# Patient Record
Sex: MALE | Race: WHITE | Employment: UNEMPLOYED | URBAN - METROPOLITAN AREA
[De-identification: names, ages, dates, MRNs, and addresses within clinical notes are randomized per-mention and may not be internally consistent; named-entity substitution may affect disease eponyms.]

---

## 2019-10-06 ENCOUNTER — APPOINTMENT (OUTPATIENT)
Dept: GENERAL RADIOLOGY | Age: 44
End: 2019-10-06
Attending: EMERGENCY MEDICINE
Payer: SELF-PAY

## 2019-10-06 ENCOUNTER — HOSPITAL ENCOUNTER (EMERGENCY)
Age: 44
Discharge: HOME | End: 2019-10-06
Attending: EMERGENCY MEDICINE
Payer: SELF-PAY

## 2019-10-06 ENCOUNTER — APPOINTMENT (OUTPATIENT)
Dept: ULTRASOUND IMAGING | Age: 44
End: 2019-10-06
Attending: EMERGENCY MEDICINE
Payer: SELF-PAY

## 2019-10-06 VITALS
RESPIRATION RATE: 16 BRPM | BODY MASS INDEX: 28.2 KG/M2 | HEART RATE: 99 BPM | SYSTOLIC BLOOD PRESSURE: 127 MMHG | HEIGHT: 70 IN | OXYGEN SATURATION: 97 % | WEIGHT: 197 LBS | TEMPERATURE: 98.3 F | DIASTOLIC BLOOD PRESSURE: 79 MMHG

## 2019-10-06 DIAGNOSIS — S89.91XA INJURY OF RIGHT KNEE, INITIAL ENCOUNTER: Primary | ICD-10-CM

## 2019-10-06 DIAGNOSIS — R60.9 DEPENDENT EDEMA: ICD-10-CM

## 2019-10-06 LAB
ALBUMIN SERPL-MCNC: 3.4 G/DL (ref 3.5–5)
ALBUMIN/GLOB SERPL: 1.2 {RATIO} (ref 1.2–3.5)
ALP SERPL-CCNC: 50 U/L (ref 50–136)
ALT SERPL-CCNC: 105 U/L (ref 12–65)
ANION GAP SERPL CALC-SCNC: 7 MMOL/L (ref 7–16)
AST SERPL-CCNC: 267 U/L (ref 15–37)
BASOPHILS # BLD: 0.1 K/UL (ref 0–0.2)
BASOPHILS NFR BLD: 1 % (ref 0–2)
BILIRUB SERPL-MCNC: 0.2 MG/DL (ref 0.2–1.1)
BUN SERPL-MCNC: 12 MG/DL (ref 6–23)
CALCIUM SERPL-MCNC: 8.7 MG/DL (ref 8.3–10.4)
CHLORIDE SERPL-SCNC: 111 MMOL/L (ref 98–107)
CO2 SERPL-SCNC: 28 MMOL/L (ref 21–32)
CREAT SERPL-MCNC: 1.03 MG/DL (ref 0.8–1.5)
DIFFERENTIAL METHOD BLD: NORMAL
EOSINOPHIL # BLD: 0.2 K/UL (ref 0–0.8)
EOSINOPHIL NFR BLD: 2 % (ref 0.5–7.8)
ERYTHROCYTE [DISTWIDTH] IN BLOOD BY AUTOMATED COUNT: 13.8 % (ref 11.9–14.6)
GLOBULIN SER CALC-MCNC: 2.9 G/DL (ref 2.3–3.5)
GLUCOSE SERPL-MCNC: 99 MG/DL (ref 65–100)
HCT VFR BLD AUTO: 43 % (ref 41.1–50.3)
HGB BLD-MCNC: 14.2 G/DL (ref 13.6–17.2)
IMM GRANULOCYTES # BLD AUTO: 0 K/UL (ref 0–0.5)
IMM GRANULOCYTES NFR BLD AUTO: 0 % (ref 0–5)
LACTATE BLD-SCNC: 1.97 MMOL/L (ref 0.5–1.9)
LYMPHOCYTES # BLD: 2.4 K/UL (ref 0.5–4.6)
LYMPHOCYTES NFR BLD: 23 % (ref 13–44)
MCH RBC QN AUTO: 31.9 PG (ref 26.1–32.9)
MCHC RBC AUTO-ENTMCNC: 33 G/DL (ref 31.4–35)
MCV RBC AUTO: 96.6 FL (ref 79.6–97.8)
MONOCYTES # BLD: 0.9 K/UL (ref 0.1–1.3)
MONOCYTES NFR BLD: 8 % (ref 4–12)
NEUTS SEG # BLD: 6.8 K/UL (ref 1.7–8.2)
NEUTS SEG NFR BLD: 66 % (ref 43–78)
NRBC # BLD: 0 K/UL (ref 0–0.2)
PLATELET # BLD AUTO: 306 K/UL (ref 150–450)
PMV BLD AUTO: 9.9 FL (ref 9.4–12.3)
POTASSIUM SERPL-SCNC: 3.9 MMOL/L (ref 3.5–5.1)
PROT SERPL-MCNC: 6.3 G/DL (ref 6.3–8.2)
RBC # BLD AUTO: 4.45 M/UL (ref 4.23–5.6)
SODIUM SERPL-SCNC: 146 MMOL/L (ref 136–145)
WBC # BLD AUTO: 10.3 K/UL (ref 4.3–11.1)

## 2019-10-06 PROCEDURE — 71046 X-RAY EXAM CHEST 2 VIEWS: CPT

## 2019-10-06 PROCEDURE — 93970 EXTREMITY STUDY: CPT

## 2019-10-06 PROCEDURE — 83605 ASSAY OF LACTIC ACID: CPT

## 2019-10-06 PROCEDURE — 85025 COMPLETE CBC W/AUTO DIFF WBC: CPT

## 2019-10-06 PROCEDURE — 73562 X-RAY EXAM OF KNEE 3: CPT

## 2019-10-06 PROCEDURE — 99284 EMERGENCY DEPT VISIT MOD MDM: CPT | Performed by: EMERGENCY MEDICINE

## 2019-10-06 PROCEDURE — 80053 COMPREHEN METABOLIC PANEL: CPT

## 2019-10-06 NOTE — ED TRIAGE NOTES
Pt reports bilateral leg swelling in calves for months, reports pain in right calf for over a month as well and right knee pain. Pt denies shob or cp. Ambulatory without difficulty. Bilateral pedal pulses present and palpable in triage.

## 2019-10-06 NOTE — ED PROVIDER NOTES
[facility] Emergency Department  Arrival Date/Time: No admission date for patient encounter. Sebastián Francisco  MRN: [id number]    YOB: 1975   37 y.o. male    [facility] EMERGENCY DEPT Room/bed info not found  Seen on 10/6/2019 @ 1:46 PM      Today's Chief Complaint: No chief complaint on file. TRIAGE Provider NOTE:  Presents with RLE pain mild redness. States both legs swollen for 3 weeks. No cp or shob. States injured knee playing basketball and has gotten worse. Legs do not get much better after rest.  RLE swollen more then Left. No previous. States hx of cellulitis. Pain above medial ankle. No significant redness. Denies alcohol abuse. Robbin Dorsey MD; 10/6/2019 @1:46 PM============================     Sebastián Francisco is a 37 y.o. male seen on 10/6/2019 at 1:46 PM in the [facility] EMERGENCY DEPT   HPI:    Here after playing basketball landing somewhat awkwardly and since that time is had some progressive pain to the right knee right calf and with this some swelling noted to the area from the knee down. A knee brace that is fairly tight to this area. No history of surgery to this. The original injury occurred 4 to 5 days ago. no CP or SOB          Review of Systems: Review of Systems   Constitutional: Negative for chills and fever. Respiratory: Negative. Cardiovascular: Negative. Genitourinary: Negative. Musculoskeletal: Positive for arthralgias. Negative for joint swelling and myalgias. All other systems reviewed and are negative. Past Medical History: Primary Care Doctor: No primary care provider on file. Meds, PMH, PSHx, SocHx at end of this note     Allergies: Allergies not on file      Key Anti-Platelet Anticoagulant Meds     The patient is on no antiplatelet meds or anticoagulants. Physical Exam:  Nursing documentation reviewed. There were no vitals filed for this visit. Vital signs were reviewed.   Physical Exam   Constitutional: He appears well-developed and well-nourished. No distress. HENT:   Head: Atraumatic. Eyes: No scleral icterus. Neck: Neck supple. Cardiovascular: Normal rate. Pulmonary/Chest: Effort normal. No respiratory distress. Musculoskeletal: Normal range of motion. Neurological: He is alert. Skin: Skin is warm and dry. Psychiatric: Thought content normal.   Nursing note and vitals reviewed. MEDICAL DECISION MAKING:   Differential Diagnosis:    MDM  Number of Diagnoses or Management Options  Dependent edema:   Injury of right knee, initial encounter:   Diagnosis management comments: With some discomfort but no instability to exam of the right knee. Radiograph shows no bony abnormality. Patient may benefit with continued problem of an MRI for further work-up. Will refer him to orthopedics. Placed him on anti-inflammatories. Amount and/or Complexity of Data Reviewed  Tests in the radiology section of CPT®: reviewed and ordered  Independent visualization of images, tracings, or specimens: yes    Risk of Complications, Morbidity, and/or Mortality  Presenting problems: moderate  Diagnostic procedures: low  Management options: moderate    Patient Progress  Patient progress: stable        Data:      Lab findings during this visit: No results found for this or any previous visit (from the past 24 hour(s)). Radiology studies during this visit:   No orders to display        Medications given in the ED: Medications - No data to display    Procedure Documentation: Procedures     Assessment and Plan:      Other ED Course Notes:        Past Medical History:    No past medical history on file. No past surgical history on file. Social History     Tobacco Use    Smoking status: Not on file   Substance Use Topics    Alcohol use: Not on file    Drug use: Not on file     Home Medication:   Cannot display prior to admission medications because the patient has not been admitted in this contact.

## 2019-10-06 NOTE — DISCHARGE INSTRUCTIONS
ibuprofen or Aleve for discomfort  Wear your knee brace but make certain you do not wear too tight  Have referred you to orthopedics for follow-up with consideration of further work-up evaluation and imaging with continued knee pain  No present evidence of blood clot in leg

## 2019-10-06 NOTE — ED NOTES
Pt ambulatory to restroom. Pt declined w/c x 3. Educated pt on benefits of using w/c versus walking with complaint and pt verbalizes understand but continues to decline w/c.